# Patient Record
Sex: FEMALE | Race: BLACK OR AFRICAN AMERICAN | NOT HISPANIC OR LATINO | Employment: OTHER | URBAN - METROPOLITAN AREA
[De-identification: names, ages, dates, MRNs, and addresses within clinical notes are randomized per-mention and may not be internally consistent; named-entity substitution may affect disease eponyms.]

---

## 2017-11-27 ENCOUNTER — OFFICE VISIT (OUTPATIENT)
Dept: GASTROENTEROLOGY | Facility: CLINIC | Age: 62
End: 2017-11-27

## 2017-11-27 VITALS
BODY MASS INDEX: 23.88 KG/M2 | WEIGHT: 134.8 LBS | DIASTOLIC BLOOD PRESSURE: 76 MMHG | TEMPERATURE: 97.9 F | HEIGHT: 63 IN | SYSTOLIC BLOOD PRESSURE: 112 MMHG

## 2017-11-27 DIAGNOSIS — K59.04 CHRONIC IDIOPATHIC CONSTIPATION: ICD-10-CM

## 2017-11-27 DIAGNOSIS — R14.0 BLOATING: Primary | ICD-10-CM

## 2017-11-27 PROCEDURE — 99204 OFFICE O/P NEW MOD 45 MIN: CPT | Performed by: INTERNAL MEDICINE

## 2017-11-27 RX ORDER — NICOTINE POLACRILEX 2 MG
GUM BUCCAL
COMMUNITY
End: 2018-09-20

## 2017-11-27 NOTE — PATIENT INSTRUCTIONS
Continue the trulance    Start a daily yogurt    Review the FODMAPS diet    For any additional questions, concerns or changes to your condition after today's office visit please contact the office at [phone].

## 2017-11-27 NOTE — PROGRESS NOTES
Chief Complaint   Patient presents with   • Bloated       Subjective     HPI    Jaycee Slaughter is a 62 y.o. female with a past medical history noted below who presents for evaluation of bloating and constipation.  Symptoms started about 6 months ago.  She developed bloating and constipation.  She felt bloated and had distension to the point that she felt she looked pregnant.  She found this was occurring following BMs and urination.  This will last about 30 minutes and then go away but will recur after her next bathroom visit.  She has had constipation that is described as hard to pass stools and straining.  She took a week long trulance sample From her PCP and found that it was efficacious; yielding soft and easy to pass stools.  She has completed the sample and awaiting this visit to decide on further constipation treatment.    His previously had issues with bloating and gas with eating broccoli.  She has since stopped eating those foods.  She generally avoids dairy milk and uses predominantly coconut milk.    No nausea or vomiting.  Her weight is stable.    Baseline prior to new complaints was a soft and easy to pass BM daily.      Saw dr Ferris but wanted a second opinion as he told her to take Metamucil.    Last colonoscopy 10/2016 with Dr Pagan and normal.      No GI malignancy in her family.  Her father had GI bleeding.    No smoking, occasional ETOH.  Works for [employer].      Past Medical History:   Diagnosis Date   • Cataract     BILATERAL EARLY   • Hypertension    • Mass of arm     RIGHT   • Psoriasis    • Psoriasis          Current Outpatient Prescriptions:   •  Biotin 1 MG capsule, Take  by mouth., Disp: , Rfl:   •  CALCIUM-VITAMIN D PO, Take  by mouth., Disp: , Rfl:   •  losartan-hydrochlorothiazide (HYZAAR) 100-25 MG per tablet, Take 1 tablet by mouth every morning., Disp: , Rfl:   •  potassium chloride (K-DUR,KLOR-CON) 20 MEQ CR tablet, Take 20 mEq by mouth daily., Disp: ,  Rfl:   •  Plecanatide (TRULANCE PO), Take  by mouth., Disp: , Rfl:   •  Plecanatide (TRULANCE) 3 MG tablet, Take 1 tablet by mouth Daily., Disp: 30 tablet, Rfl: 2    No Known Allergies    Social History     Social History   • Marital status: Single     Spouse name: N/A   • Number of children: N/A   • Years of education: N/A     Occupational History   • Not on file.     Social History Main Topics   • Smoking status: Never Smoker   • Smokeless tobacco: Never Used   • Alcohol use No   • Drug use: No   • Sexual activity: Defer     Other Topics Concern   • Not on file     Social History Narrative       Family History   Problem Relation Age of Onset   • Cancer Mother      skin - basil cell carcinoma   • Bleeding Disorder Father      intestinal       Review of Systems   Constitutional: Negative for activity change, appetite change and fatigue.   HENT: Negative for sore throat and trouble swallowing.    Respiratory: Negative.    Cardiovascular: Negative.    Gastrointestinal: Positive for abdominal distention and constipation. Negative for abdominal pain, blood in stool, nausea and vomiting.   Endocrine: Negative for cold intolerance and heat intolerance.   Genitourinary: Negative for difficulty urinating, dysuria and frequency.   Musculoskeletal: Negative for arthralgias, back pain and myalgias.   Skin: Negative.    Hematological: Negative for adenopathy. Does not bruise/bleed easily.   All other systems reviewed and are negative.      Objective     Vitals:    11/27/17 0944   BP: 112/76   Temp: 97.9 °F (36.6 °C)     Last 2 weights    11/27/17  0944   Weight: 134 lb 12.8 oz (61.1 kg)     Body mass index is 23.88 kg/(m^2).    Physical Exam   Constitutional: She is oriented to person, place, and time. She appears well-developed and well-nourished. No distress.   HENT:   Head: Normocephalic and atraumatic.   Right Ear: External ear normal.   Left Ear: External ear normal.   Nose: Nose normal.   Mouth/Throat: Oropharynx is clear  and moist.   Eyes: Conjunctivae and EOM are normal. Right eye exhibits no discharge. Left eye exhibits no discharge. No scleral icterus.   Neck: Normal range of motion. Neck supple. No thyromegaly present.   No supraclavicular adenopathy   Cardiovascular: Normal rate, regular rhythm, normal heart sounds and intact distal pulses.  Exam reveals no gallop.    No murmur heard.  No lower extremity edema   Pulmonary/Chest: Effort normal and breath sounds normal. No respiratory distress. She has no wheezes.   Abdominal: Soft. Normal appearance and bowel sounds are normal. She exhibits no distension and no mass. There is no hepatosplenomegaly. There is no tenderness. There is no rigidity, no rebound and no guarding. No hernia.   Genitourinary:   Genitourinary Comments: Rectal exam deferred   Musculoskeletal: Normal range of motion. She exhibits no edema or tenderness.   No atrophy of upper or lower extremities.  Normal digits and nails of both hands.   Lymphadenopathy:     She has no cervical adenopathy.   Neurological: She is alert and oriented to person, place, and time. She displays no atrophy. Coordination normal.   Skin: Skin is warm and dry. No rash noted. She is not diaphoretic. No erythema.   Psychiatric: She has a normal mood and affect. Her behavior is normal. Judgment and thought content normal.   Vitals reviewed.      WBC   Date Value Ref Range Status   03/14/2016 7.80 4.50 - 10.70 10*3/mm3 Final     RBC   Date Value Ref Range Status   03/14/2016 4.03 3.90 - 5.20 10*6/mm3 Final     Hemoglobin   Date Value Ref Range Status   03/14/2016 12.6 11.9 - 15.5 g/dL Final     Hematocrit   Date Value Ref Range Status   03/14/2016 37.4 35.6 - 45.5 % Final     MCV   Date Value Ref Range Status   03/14/2016 92.8 80.5 - 98.2 fL Final     MCH   Date Value Ref Range Status   03/14/2016 31.3 26.9 - 32.7 pg Final     MCHC   Date Value Ref Range Status   03/14/2016 33.7 32.4 - 36.3 g/dL Final     RDW   Date Value Ref Range Status    03/14/2016 13.3 (H) 11.7 - 13.0 % Final     RDW-SD   Date Value Ref Range Status   03/14/2016 44.9 37.0 - 54.0 fl Final     MPV   Date Value Ref Range Status   03/14/2016 10.7 6.0 - 12.0 fL Final     Platelets   Date Value Ref Range Status   03/14/2016 273 140 - 500 10*3/mm3 Final       Glucose   Date Value Ref Range Status   03/14/2016 83 65 - 99 mg/dL Final     Sodium   Date Value Ref Range Status   03/14/2016 141 136 - 145 mmol/L Final     Potassium   Date Value Ref Range Status   03/14/2016 3.1 (L) 3.5 - 5.2 mmol/L Final     CO2   Date Value Ref Range Status   03/14/2016 28.1 22.0 - 29.0 mmol/L Final     Chloride   Date Value Ref Range Status   03/14/2016 101 99 - 107 mmol/L Final     Anion Gap   Date Value Ref Range Status   03/14/2016 11.9 mmol/L Final     Creatinine   Date Value Ref Range Status   03/14/2016 0.73 0.57 - 1.00 mg/dL Final     BUN   Date Value Ref Range Status   03/14/2016 15 8 - 23 mg/dL Final     BUN/Creatinine Ratio   Date Value Ref Range Status   03/14/2016 20.5 7.0 - 25.0 Final     Calcium   Date Value Ref Range Status   03/14/2016 9.4 8.6 - 10.5 mg/dL Final         Imaging Results (last 7 days)     ** No results found for the last 168 hours. **            No notes on file    Assessment/Plan    1. Bloating: New symptom for her, interestingly following bowel movements and urination.  Short duration.?  Aerophagia or dysbiosis    2. Constipation: ? Association with above, she has done well with trulance samples from her pcp    Plan  -continue trulance for constipation  -to start daily yogurt  -review fodmaps diet  -Will get records of her last colonoscopy  -Back in a few months to see how she is doing    Jaycee was seen today for bloated.    Diagnoses and all orders for this visit:    Bloating    Chronic idiopathic constipation  -     Plecanatide (TRULANCE) 3 MG tablet; Take 1 tablet by mouth Daily.        I have discussed the above plan with the patient.  They verbalize understanding and are  in agreement with the plan.  They have been advised to contact the office for any questions, concerns, or changes related to their health.    Dictated utilizing Dragon dictation

## 2017-12-01 ENCOUNTER — DOCUMENTATION (OUTPATIENT)
Dept: GASTROENTEROLOGY | Facility: CLINIC | Age: 62
End: 2017-12-01

## 2017-12-13 ENCOUNTER — TELEPHONE (OUTPATIENT)
Dept: GASTROENTEROLOGY | Facility: CLINIC | Age: 62
End: 2017-12-13

## 2017-12-13 NOTE — TELEPHONE ENCOUNTER
We can try linzess 145mcg.  Let's try a week of samples first if she can come in and pick them up. Thx

## 2017-12-13 NOTE — TELEPHONE ENCOUNTER
----- Message from Elva Steven sent at 12/13/2017 10:29 AM EST -----  Regarding: PT CALLED - TRULANCE GAVE PT SEVERE DIARRHEA  Contact: [phone]  ALSO MED IS TO EXPENSIVE. PHARM DID CALL. WHAT IS AN ALTERNATIVE? OR CELL [phone]. PT SAID IT IS OKAY TO LEAVE INFO ON [facility].

## 2017-12-13 NOTE — TELEPHONE ENCOUNTER
Call to pt.  Advise per Dr Becerra that can try Linzess 145 mcg.  Try a week of samples first.  Pt verb understanding.  2 boxes of Linzess 145 mcg at .

## 2018-09-20 ENCOUNTER — OFFICE VISIT (OUTPATIENT)
Dept: GASTROENTEROLOGY | Facility: CLINIC | Age: 63
End: 2018-09-20

## 2018-09-20 VITALS
DIASTOLIC BLOOD PRESSURE: 72 MMHG | SYSTOLIC BLOOD PRESSURE: 114 MMHG | BODY MASS INDEX: 24.63 KG/M2 | TEMPERATURE: 98.5 F | HEIGHT: 63 IN | WEIGHT: 139 LBS

## 2018-09-20 DIAGNOSIS — R14.0 BLOATING: ICD-10-CM

## 2018-09-20 DIAGNOSIS — K59.09 OTHER CONSTIPATION: Primary | ICD-10-CM

## 2018-09-20 PROCEDURE — 99213 OFFICE O/P EST LOW 20 MIN: CPT | Performed by: INTERNAL MEDICINE

## 2018-09-20 NOTE — PATIENT INSTRUCTIONS
Try gas x-- 2 tabs every morning for the next 2 weeks    If no improvement, then will consider rifaximin antibiotic    Try IB Justus-- 1-2 capsules for bloating, discomfort    For any additional questions, concerns or changes to your condition after today's office visit please contact the office at [phone].

## 2018-09-20 NOTE — PROGRESS NOTES
Chief Complaint   Patient presents with   • Follow-up     Bloating     Subjective     HPI  Jaycee Slaughter is a 63 y.o. female who presents for follow-up of constipation and bloating.  Was seen last in November 2017.  We had tried Trulance but this gave her severe diarrhea.  We then tried 145 µg of Linzess.  This did help her but since retiring in December, she hasn't needed much medication for constipation.    He has been quite regular without needing any kind of supplementation.  However she persists with symptoms of bloating.    Bloating following BMs.  Feels swollen in her upper abdomen region.  Lasts about 30 to 40 minutes and then goes away.  Reviewed fodmaps, cut out broccoli, fiber bars but still persists.  Does occasionally take gas ex for severe bloating symptoms but has not used it on a regular basis.    Her weight is stable.  She is eating drinking well.  She otherwise feels fine.    Currently in a psoriasis study    She is up-to-date on her screening colonoscopy      Past Medical History:   Diagnosis Date   • Cataract     BILATERAL EARLY   • Hypertension    • Mass of arm     RIGHT   • Psoriasis    • Psoriasis        Social History     Social History   • Marital status: Single     Social History Main Topics   • Smoking status: Never Smoker   • Smokeless tobacco: Never Used   • Alcohol use No   • Drug use: No   • Sexual activity: Defer     Other Topics Concern   • Not on file         Current Outpatient Prescriptions:   •  losartan-hydrochlorothiazide (HYZAAR) 100-25 MG per tablet, Take 1 tablet by mouth every morning., Disp: , Rfl:   •  potassium chloride (K-DUR,KLOR-CON) 20 MEQ CR tablet, Take 20 mEq by mouth daily., Disp: , Rfl:   •  Secukinumab (COSENTYX SC), Inject  under the skin into the appropriate area as directed Every 30 (Thirty) Days., Disp: , Rfl:     Review of Systems   Constitutional: Negative for activity change, appetite change, chills and fever.   HENT: Negative for trouble  swallowing.    Respiratory: Negative.    Cardiovascular: Negative.  Negative for chest pain.   Gastrointestinal: Positive for abdominal distention. Negative for abdominal pain, anal bleeding, constipation, diarrhea, nausea and vomiting.   Genitourinary: Negative for dysuria, frequency and hematuria.       Objective   Vitals:    09/20/18 1055   BP: 114/72   Temp: 98.5 °F (36.9 °C)     1    09/20/18  1055   Weight: 63 kg (139 lb)     Body mass index is 24.62 kg/m².      Physical Exam   Constitutional: She is oriented to person, place, and time. She appears well-developed and well-nourished. No distress.   HENT:   Head: Normocephalic and atraumatic.   Right Ear: External ear normal.   Left Ear: External ear normal.   Nose: Nose normal.   Mouth/Throat: Oropharynx is clear and moist.   Eyes: Conjunctivae and EOM are normal. Right eye exhibits no discharge. Left eye exhibits no discharge. No scleral icterus.   Neck: Normal range of motion. Neck supple. No thyromegaly present.   No supraclavicular adenopathy   Cardiovascular: Normal rate, regular rhythm, normal heart sounds and intact distal pulses.  Exam reveals no gallop.    No murmur heard.  No lower extremity edema   Pulmonary/Chest: Effort normal and breath sounds normal. No respiratory distress. She has no wheezes.   Abdominal: Soft. Normal appearance and bowel sounds are normal. She exhibits no distension and no mass. There is no hepatosplenomegaly. There is no tenderness. There is no rigidity, no rebound and no guarding. No hernia.   Genitourinary:   Genitourinary Comments: Rectal exam deferred   Musculoskeletal: Normal range of motion. She exhibits no edema or tenderness.   No atrophy of upper or lower extremities.  Normal digits and nails of both hands.   Lymphadenopathy:     She has no cervical adenopathy.   Neurological: She is alert and oriented to person, place, and time. She displays no atrophy. Coordination normal.   Skin: Skin is warm and dry. No rash  noted. She is not diaphoretic. No erythema.   Psychiatric: She has a normal mood and affect. Her behavior is normal. Judgment and thought content normal.   Vitals reviewed.      WBC   Date Value Ref Range Status   03/14/2016 7.80 4.50 - 10.70 10*3/mm3 Final     RBC   Date Value Ref Range Status   03/14/2016 4.03 3.90 - 5.20 10*6/mm3 Final     Hemoglobin   Date Value Ref Range Status   03/14/2016 12.6 11.9 - 15.5 g/dL Final     Hematocrit   Date Value Ref Range Status   03/14/2016 37.4 35.6 - 45.5 % Final     MCV   Date Value Ref Range Status   03/14/2016 92.8 80.5 - 98.2 fL Final     MCH   Date Value Ref Range Status   03/14/2016 31.3 26.9 - 32.7 pg Final     MCHC   Date Value Ref Range Status   03/14/2016 33.7 32.4 - 36.3 g/dL Final     RDW   Date Value Ref Range Status   03/14/2016 13.3 (H) 11.7 - 13.0 % Final     RDW-SD   Date Value Ref Range Status   03/14/2016 44.9 37.0 - 54.0 fl Final     MPV   Date Value Ref Range Status   03/14/2016 10.7 6.0 - 12.0 fL Final     Platelets   Date Value Ref Range Status   03/14/2016 273 140 - 500 10*3/mm3 Final       Lab Results   Component Value Date    GLUCOSE 83 03/14/2016    BUN 15 03/14/2016    CREATININE 0.73 03/14/2016    EGFRIFAFRI 99 03/14/2016    BCR 20.5 03/14/2016    CO2 28.1 03/14/2016    CALCIUM 9.4 03/14/2016         Imaging Results (last 7 days)     ** No results found for the last 168 hours. **            Assessment/Plan    Bloating: persist despite improved constipation.  ? dysbiosis vs functional    Constipation: resolved    Plan  Gas x-- 2 tabs every morning for the next 2 weeks    If no improvement, then will consider rifaximin antibiotic    IB Justus-- 1-2 capsules for bloating, discomfort    Jaycee was seen today for follow-up.    Diagnoses and all orders for this visit:    Other constipation    Bloating        Dictated utilizing Dragon dictation

## 2018-10-16 ENCOUNTER — TELEPHONE (OUTPATIENT)
Dept: GASTROENTEROLOGY | Facility: CLINIC | Age: 63
End: 2018-10-16

## 2018-12-11 ENCOUNTER — TELEPHONE (OUTPATIENT)
Dept: GASTROENTEROLOGY | Facility: CLINIC | Age: 63
End: 2018-12-11

## 2018-12-11 NOTE — TELEPHONE ENCOUNTER
"Call to pt.  States tried IBGard as instructed with call of 10/16.  Continues to experience bloating when has daily BM.  States while sitting on toilet, stomach swells and \"look like 4 mo's pregnant\".  Lasts about 45 min.    States understood that if IBGard and GasX did not work, that abx that targets gut may be next step.  Interested in pursuing this unless too expensive.  States cannot afford pill that costs $100.  Advise that sometimes PA may mitigate cost.    Update/request to Dr Becerra.  "

## 2018-12-11 NOTE — TELEPHONE ENCOUNTER
----- Message from Elva Steven sent at 12/11/2018  8:49 AM EST -----  Regarding:  UPDATE DATE ON BLOATING & NEXT STEP  Contact: [phone]  PT HAS TO LEAVE HOME AROUND 2PM WILL RETURN ABOUT 3:30.

## 2018-12-12 NOTE — TELEPHONE ENCOUNTER
Call to pt.  Advise per Dr Becerra that rifaximin has been ordered.    Advise that PA will probably be required.  Verb understanding.

## 2018-12-12 NOTE — TELEPHONE ENCOUNTER
Call from pt.  States spoke with pharmacy - PA on xifaxan not required.  However, cost if $796 - pt cannot afford.  Asking for alternative.     Message to DR Becerra.

## 2018-12-13 DIAGNOSIS — R14.0 ABDOMINAL BLOATING: Primary | ICD-10-CM

## 2018-12-13 RX ORDER — SODIUM CHLORIDE, SODIUM LACTATE, POTASSIUM CHLORIDE, CALCIUM CHLORIDE 600; 310; 30; 20 MG/100ML; MG/100ML; MG/100ML; MG/100ML
30 INJECTION, SOLUTION INTRAVENOUS CONTINUOUS
Status: CANCELLED | OUTPATIENT
Start: 2019-01-15

## 2018-12-13 NOTE — TELEPHONE ENCOUNTER
There really isn't an alternative to the Xifaxan.    I do recommend having an EGD and we can do small bowel aspirate to culture and see if she actually has small intestinal bacterial overgrowth.  If she does, in fact, have SIBO, then a systemic antibiotic would be warranted.    I have entered the EGD case request.

## 2018-12-14 NOTE — TELEPHONE ENCOUNTER
Call to pt.  Advise per Dr Becerra that there really isn't an alternative to xifaxan.    Recommend having an egd and can do small bowel aspirate to culture and see if actually has sibo.  IF does, in fact, have sibo, then a systemic abx would be warranted.    Advise pt that Scheduling will contact to arrange.  Verb understanding.

## 2019-01-04 PROBLEM — R14.0 ABDOMINAL BLOATING: Status: ACTIVE | Noted: 2019-01-04

## 2019-01-15 ENCOUNTER — ANESTHESIA (OUTPATIENT)
Dept: GASTROENTEROLOGY | Facility: HOSPITAL | Age: 64
End: 2019-01-15

## 2019-01-15 ENCOUNTER — HOSPITAL ENCOUNTER (OUTPATIENT)
Facility: HOSPITAL | Age: 64
Setting detail: HOSPITAL OUTPATIENT SURGERY
Discharge: HOME | End: 2019-01-15
Attending: INTERNAL MEDICINE | Admitting: INTERNAL MEDICINE

## 2019-01-15 ENCOUNTER — ANESTHESIA EVENT (OUTPATIENT)
Dept: GASTROENTEROLOGY | Facility: HOSPITAL | Age: 64
End: 2019-01-15

## 2019-01-15 VITALS
SYSTOLIC BLOOD PRESSURE: 131 MMHG | WEIGHT: 133 LBS | OXYGEN SATURATION: 100 % | RESPIRATION RATE: 12 BRPM | HEIGHT: 63 IN | BODY MASS INDEX: 23.57 KG/M2 | HEART RATE: 60 BPM | DIASTOLIC BLOOD PRESSURE: 79 MMHG | TEMPERATURE: 97.8 F

## 2019-01-15 DIAGNOSIS — R14.0 ABDOMINAL BLOATING: ICD-10-CM

## 2019-01-15 PROCEDURE — 88305 TISSUE EXAM BY PATHOLOGIST: CPT | Performed by: INTERNAL MEDICINE

## 2019-01-15 PROCEDURE — 25010000002 PROPOFOL 10 MG/ML EMULSION: Performed by: ANESTHESIOLOGY

## 2019-01-15 PROCEDURE — 87071 CULTURE AEROBIC QUANT OTHER: CPT | Performed by: INTERNAL MEDICINE

## 2019-01-15 PROCEDURE — S0260 H&P FOR SURGERY: HCPCS | Performed by: INTERNAL MEDICINE

## 2019-01-15 PROCEDURE — 43239 EGD BIOPSY SINGLE/MULTIPLE: CPT | Performed by: INTERNAL MEDICINE

## 2019-01-15 RX ORDER — SODIUM CHLORIDE, SODIUM LACTATE, POTASSIUM CHLORIDE, CALCIUM CHLORIDE 600; 310; 30; 20 MG/100ML; MG/100ML; MG/100ML; MG/100ML
30 INJECTION, SOLUTION INTRAVENOUS CONTINUOUS
Status: DISCONTINUED | OUTPATIENT
Start: 2019-01-15 | End: 2019-01-15 | Stop reason: HOSPADM

## 2019-01-15 RX ORDER — PROMETHAZINE HYDROCHLORIDE 25 MG/ML
12.5 INJECTION, SOLUTION INTRAMUSCULAR; INTRAVENOUS ONCE AS NEEDED
Status: DISCONTINUED | OUTPATIENT
Start: 2019-01-15 | End: 2019-01-15 | Stop reason: HOSPADM

## 2019-01-15 RX ORDER — PROMETHAZINE HYDROCHLORIDE 25 MG/1
25 TABLET ORAL ONCE AS NEEDED
Status: DISCONTINUED | OUTPATIENT
Start: 2019-01-15 | End: 2019-01-15 | Stop reason: HOSPADM

## 2019-01-15 RX ORDER — PROMETHAZINE HYDROCHLORIDE 25 MG/1
25 SUPPOSITORY RECTAL ONCE AS NEEDED
Status: DISCONTINUED | OUTPATIENT
Start: 2019-01-15 | End: 2019-01-15 | Stop reason: HOSPADM

## 2019-01-15 RX ORDER — PROPOFOL 10 MG/ML
VIAL (ML) INTRAVENOUS AS NEEDED
Status: DISCONTINUED | OUTPATIENT
Start: 2019-01-15 | End: 2019-01-15 | Stop reason: SURG

## 2019-01-15 RX ADMIN — PROPOFOL 50 MG: 10 INJECTION, EMULSION INTRAVENOUS at 10:21

## 2019-01-15 RX ADMIN — PROPOFOL 100 MG: 10 INJECTION, EMULSION INTRAVENOUS at 10:13

## 2019-01-15 RX ADMIN — SODIUM CHLORIDE, POTASSIUM CHLORIDE, SODIUM LACTATE AND CALCIUM CHLORIDE: 600; 310; 30; 20 INJECTION, SOLUTION INTRAVENOUS at 10:12

## 2019-01-15 RX ADMIN — SODIUM CHLORIDE, POTASSIUM CHLORIDE, SODIUM LACTATE AND CALCIUM CHLORIDE 30 ML/HR: 600; 310; 30; 20 INJECTION, SOLUTION INTRAVENOUS at 09:28

## 2019-01-15 RX ADMIN — PROPOFOL 50 MG: 10 INJECTION, EMULSION INTRAVENOUS at 10:19

## 2019-01-15 NOTE — ANESTHESIA PREPROCEDURE EVALUATION
Anesthesia Evaluation     NPO Solid Status: > 8 hours             Airway   Mallampati: II  TM distance: >3 FB  Neck ROM: full  Dental - normal exam     Pulmonary - normal exam   Cardiovascular - normal exam    (+) hypertension,       Neuro/Psych  GI/Hepatic/Renal/Endo      Musculoskeletal     Abdominal    Substance History      OB/GYN          Other                        Anesthesia Plan    ASA 2     Anesthetic plan, all risks, benefits, and alternatives have been provided, discussed and informed consent has been obtained with: patient.

## 2019-01-15 NOTE — H&P
[facility]  Pre Procedure History & Physical    Chief Complaint: abdominal bloating, distension      HPI:   63 y.o. female who presents for follow-up of constipation and bloating.  Was seen last in November 2017.  We had tried Trulance but this gave her severe diarrhea.  We then tried 145 µg of Linzess.  This did help her but since retiring in December, she hasn't needed much medication for constipation.     He has been quite regular without needing any kind of supplementation.  However she persists with symptoms of bloating.     Bloating following BMs.  Feels swollen in her upper abdomen region.  Lasts about 30 to 40 minutes and then goes away.  Reviewed fodmaps, cut out broccoli, fiber bars but still persists.  Does occasionally take gas ex for severe bloating symptoms but has not used it on a regular basis.     Her weight is stable.  She is eating drinking well.  She otherwise feels fine.     Currently in a psoriasis study     She is up-to-date on her screening colonoscopy    Past Medical History:   Past Medical History:   Diagnosis Date   • Cataract     BILATERAL EARLY   • Hypertension    • Mass of arm     RIGHT   • Psoriasis    • Psoriasis        Family History:  Family History   Problem Relation Age of Onset   • Cancer Mother         skin - basil cell carcinoma   • Bleeding Disorder Father         intestinal       Social History:   reports that  has never smoked. she has never used smokeless tobacco. She reports that she does not drink alcohol or use drugs.    Medications:   Medications Prior to Admission   Medication Sig Dispense Refill Last Dose   • losartan-hydrochlorothiazide (HYZAAR) 100-25 MG per tablet Take 1 tablet by mouth every morning.   1/15/2019 at 0700   • Secukinumab (COSENTYX SC) Inject  under the skin into the appropriate area as directed Every 30 (Thirty) Days.   1/9/2019       Allergies:  Patient has no known allergies.    ROS:    Pertinent items are noted in HPI  "    Objective     Blood pressure 142/97, pulse 77, temperature 97.8 °F (36.6 °C), temperature source Oral, resp. rate 12, height 160 cm (63\"), weight 60.3 kg (133 lb), SpO2 100 %.    Physical Exam   Constitutional: Pt is oriented to person, place, and time and well-developed, well-nourished, and in no distress.   HENT:   Mouth/Throat: Oropharynx is clear and moist.   Neck: Normal range of motion. Neck supple.   Cardiovascular: Normal rate, regular rhythm and normal heart sounds.    Pulmonary/Chest: Effort normal and breath sounds normal. No respiratory distress. No  wheezes.   Abdominal: Soft. Bowel sounds are normal.   Skin: Skin is warm and dry.   Psychiatric: Mood, memory, affect and judgment normal.     Assessment/Plan     Diagnosis: abdominal bloating, distension        Anticipated Surgical Procedure:  EGD      The risks, benefits, and alternatives of this procedure have been discussed with the patient or the responsible party- the patient understands and agrees to proceed.  "

## 2019-01-15 NOTE — ANESTHESIA POSTPROCEDURE EVALUATION
Patient: Jaycee Pathak    Procedure Summary     Date:  01/15/19 Room / Location:   [facility]    Anesthesia Start:  1012 Anesthesia Stop:  1033    Procedure:  ESOPHAGOGASTRODUODENOSCOPY with duodenal aspirate and biopsies (N/A Esophagus) Diagnosis:       Abdominal bloating      (Abdominal bloating [R14.0])    Surgeon:  Renetta Becerra MD Provider:  Yovany Angulo MD    Anesthesia Type:  MAC ASA Status:  2          Anesthesia Type: MAC  Last vitals  BP   104/68 (01/15/19 1032)   Temp   36.6 °C (97.8 °F) (01/15/19 1032)   Pulse   78 (01/15/19 1032)   Resp   12 (01/15/19 1032)     SpO2   99 % (01/15/19 1032)     Post Anesthesia Care and Evaluation    Patient location during evaluation: bedside  Pain management: adequate  Airway patency: patent  Anesthetic complications: No anesthetic complications    Cardiovascular status: acceptable  Respiratory status: acceptable  Hydration status: acceptable

## 2019-01-16 LAB
CYTO UR: NORMAL
LAB AP CASE REPORT: NORMAL
LAB AP DIAGNOSIS COMMENT: NORMAL
PATH REPORT.FINAL DX SPEC: NORMAL
PATH REPORT.GROSS SPEC: NORMAL

## 2019-01-17 LAB — BACTERIA SPEC AEROBE CULT: NORMAL

## 2019-01-22 NOTE — PROGRESS NOTES
The biopsies of her stomach and small bowel only showed some very very mild inflammation.    Her small bowel culture did not grow out anything.    She'll like to see a dietitian to review the FODMAPS diet to help with gas and bloating?

## 2019-01-23 ENCOUNTER — TELEPHONE (OUTPATIENT)
Dept: GASTROENTEROLOGY | Facility: CLINIC | Age: 64
End: 2019-01-23

## 2019-01-23 NOTE — TELEPHONE ENCOUNTER
----- Message from Renetta Becerra MD sent at 1/22/2019  5:27 PM EST -----  The biopsies of her stomach and small bowel only showed some very very mild inflammation.    Her small bowel culture did not grow out anything.    She'll like to see a dietitian to review the FODMAPS diet to help with gas and bloating?

## 2019-01-23 NOTE — TELEPHONE ENCOUNTER
Called pt and advised per Dr Becerra that the bx of her stomach and sm bowel showed only some very very mild inflammation.    Her sm bowel culture did not grown out anything.     Pt verb understanding and would like a list of dieticians she can call for their fees .  Pt does not think her insurance will cover this. ADvised pt will send her a list.     Pt verb understanding and stated to be sure and thank Dr Becerra for her.

## 2019-11-25 ENCOUNTER — TRANSCRIBE ORDERS (OUTPATIENT)
Dept: ADMINISTRATIVE | Facility: HOSPITAL | Age: 64
End: 2019-11-25

## 2019-11-25 DIAGNOSIS — Z12.31 VISIT FOR SCREENING MAMMOGRAM: Primary | ICD-10-CM

## 2021-03-16 ENCOUNTER — BULK ORDERING (OUTPATIENT)
Dept: CASE MANAGEMENT | Facility: OTHER | Age: 66
End: 2021-03-16

## 2021-03-16 DIAGNOSIS — Z23 IMMUNIZATION DUE: ICD-10-CM

## 2022-01-08 ENCOUNTER — HOSPITAL ENCOUNTER (EMERGENCY)
Facility: HOSPITAL | Age: 67
Discharge: HOME | End: 2022-01-08
Attending: EMERGENCY MEDICINE | Admitting: EMERGENCY MEDICINE

## 2022-01-08 VITALS
RESPIRATION RATE: 16 BRPM | TEMPERATURE: 97.6 F | OXYGEN SATURATION: 98 % | DIASTOLIC BLOOD PRESSURE: 62 MMHG | SYSTOLIC BLOOD PRESSURE: 102 MMHG | HEART RATE: 78 BPM

## 2022-01-08 DIAGNOSIS — R55 NEAR SYNCOPE: Primary | ICD-10-CM

## 2022-01-08 LAB
ALBUMIN SERPL-MCNC: 3.6 G/DL (ref 3.5–5.2)
ALBUMIN/GLOB SERPL: 1.1 G/DL
ALP SERPL-CCNC: 91 U/L (ref 39–117)
ALT SERPL W P-5'-P-CCNC: 11 U/L (ref 1–33)
ANION GAP SERPL CALCULATED.3IONS-SCNC: 9.5 MMOL/L (ref 5–15)
AST SERPL-CCNC: 18 U/L (ref 1–32)
BASOPHILS # BLD AUTO: 0.06 10*3/MM3 (ref 0–0.2)
BASOPHILS NFR BLD AUTO: 0.5 % (ref 0–1.5)
BILIRUB SERPL-MCNC: 0.3 MG/DL (ref 0–1.2)
BUN SERPL-MCNC: 16 MG/DL (ref 8–23)
BUN/CREAT SERPL: 17.4 (ref 7–25)
CALCIUM SPEC-SCNC: 8.9 MG/DL (ref 8.6–10.5)
CHLORIDE SERPL-SCNC: 106 MMOL/L (ref 98–107)
CO2 SERPL-SCNC: 26.5 MMOL/L (ref 22–29)
CREAT SERPL-MCNC: 0.92 MG/DL (ref 0.57–1)
DEPRECATED RDW RBC AUTO: 43.8 FL (ref 37–54)
EOSINOPHIL # BLD AUTO: 0.11 10*3/MM3 (ref 0–0.4)
EOSINOPHIL NFR BLD AUTO: 1 % (ref 0.3–6.2)
ERYTHROCYTE [DISTWIDTH] IN BLOOD BY AUTOMATED COUNT: 13.1 % (ref 12.3–15.4)
GFR SERPL CREATININE-BSD FRML MDRD: 74 ML/MIN/1.73
GLOBULIN UR ELPH-MCNC: 3.3 GM/DL
GLUCOSE SERPL-MCNC: 131 MG/DL (ref 65–99)
HCT VFR BLD AUTO: 34.9 % (ref 34–46.6)
HGB BLD-MCNC: 12.4 G/DL (ref 12–15.9)
IMM GRANULOCYTES # BLD AUTO: 0.08 10*3/MM3 (ref 0–0.05)
IMM GRANULOCYTES NFR BLD AUTO: 0.7 % (ref 0–0.5)
LYMPHOCYTES # BLD AUTO: 2 10*3/MM3 (ref 0.7–3.1)
LYMPHOCYTES NFR BLD AUTO: 17.6 % (ref 19.6–45.3)
MCH RBC QN AUTO: 32.9 PG (ref 26.6–33)
MCHC RBC AUTO-ENTMCNC: 35.5 G/DL (ref 31.5–35.7)
MCV RBC AUTO: 92.6 FL (ref 79–97)
MONOCYTES # BLD AUTO: 0.73 10*3/MM3 (ref 0.1–0.9)
MONOCYTES NFR BLD AUTO: 6.4 % (ref 5–12)
NEUTROPHILS NFR BLD AUTO: 73.8 % (ref 42.7–76)
NEUTROPHILS NFR BLD AUTO: 8.36 10*3/MM3 (ref 1.7–7)
NRBC BLD AUTO-RTO: 0 /100 WBC (ref 0–0.2)
PLATELET # BLD AUTO: 265 10*3/MM3 (ref 140–450)
PMV BLD AUTO: 11.1 FL (ref 6–12)
POTASSIUM SERPL-SCNC: 3.6 MMOL/L (ref 3.5–5.2)
PROT SERPL-MCNC: 6.9 G/DL (ref 6–8.5)
RBC # BLD AUTO: 3.77 10*6/MM3 (ref 3.77–5.28)
SODIUM SERPL-SCNC: 142 MMOL/L (ref 136–145)
WBC NRBC COR # BLD: 11.34 10*3/MM3 (ref 3.4–10.8)

## 2022-01-08 PROCEDURE — 80053 COMPREHEN METABOLIC PANEL: CPT | Performed by: EMERGENCY MEDICINE

## 2022-01-08 PROCEDURE — 93005 ELECTROCARDIOGRAM TRACING: CPT

## 2022-01-08 PROCEDURE — 99283 EMERGENCY DEPT VISIT LOW MDM: CPT

## 2022-01-08 PROCEDURE — 36415 COLL VENOUS BLD VENIPUNCTURE: CPT

## 2022-01-08 PROCEDURE — 93010 ELECTROCARDIOGRAM REPORT: CPT | Performed by: INTERNAL MEDICINE

## 2022-01-08 PROCEDURE — 85025 COMPLETE CBC W/AUTO DIFF WBC: CPT | Performed by: EMERGENCY MEDICINE

## 2022-01-08 NOTE — ED NOTES
Patient from [facility] via EMS; patient had given blood at 2:30 today and had a syncopal episode while shopping. Patient has not eaten today and has also not given blood in multiple years.      Tiffany Morales RN  01/08/22 8503

## 2022-01-08 NOTE — ED PROVIDER NOTES
EMERGENCY DEPARTMENT ENCOUNTER    Room Number:  40/40  Date of encounter:  1/8/2022  PCP: Christopher Azevedo MD  Historian: Patient      HPI:  Chief Complaint: Near syncope  A complete HPI/ROS/PMH/PSH/SH/FH are unobtainable due to: Nothing    Context: Jaycee Pathak is a 66 y.o. female who presents to the ED c/o syncopal episode that occurred earlier today just after she donated some blood.  Patient said she left the donation center feeling okay and drinking some juice.  She went [place]door to [employer] and after walking around for a few minutes she began to feel lightheaded.  She asked for assistance to get over to a chair, and then she began feeling very anxious, dizzy and lightheaded, and had numbness and tingling in all her extremities.  It appears as though she was hyperventilating.    She did not completely pass out, and all the symptoms resolved after about 20 to 30 minutes.  She was brought by EMS, and says she feels much better now.  She really does not state any chest pain to me, but she did say she was quite short of breath.    Patient has a history of passing out one other time, but no history of coronary artery disease or any other significant medical abnormalities.  She said this is the first time she donated blood about 12 years, as far she knows it was an uneventful donation of a regular amount of blood      PAST MEDICAL HISTORY  Active Ambulatory Problems     Diagnosis Date Noted   • Other constipation 09/20/2018   • Bloating 09/20/2018   • Abdominal bloating 01/04/2019     Resolved Ambulatory Problems     Diagnosis Date Noted   • No Resolved Ambulatory Problems     Past Medical History:   Diagnosis Date   • Cataract    • Hypertension    • Mass of arm    • Psoriasis    • Psoriasis          PAST SURGICAL HISTORY  Past Surgical History:   Procedure Laterality Date   • COLONOSCOPY  2016   • ENDOSCOPY N/A 1/15/2019    Procedure: ESOPHAGOGASTRODUODENOSCOPY with duodenal aspirate and biopsies;   Surgeon: Renetta Becerra MD;  Location: [facility] ENDOSCOPY;  Service: Gastroenterology   • EXCISION MASS ARM Right 3/18/2016    Procedure: RT FOREARM EXCISION MASS UPPER EXTREMITY;  Surgeon: Richard Alba MD;  Location: [facility] OR [facility] [place];  Service:    • GANGLION CYST EXCISION Left    • HYSTERECTOMY  2007    total         FAMILY HISTORY  Family History   Problem Relation Age of Onset   • Cancer Mother         skin - basil cell carcinoma   • Bleeding Disorder Father         intestinal         SOCIAL HISTORY  Social History     Socioeconomic History   • Marital status: Single   Tobacco Use   • Smoking status: Never Smoker   • Smokeless tobacco: Never Used   Substance and Sexual Activity   • Alcohol use: No   • Drug use: No   • Sexual activity: Defer         ALLERGIES  Patient has no known allergies.        REVIEW OF SYSTEMS  Review of Systems     All systems reviewed and negative except for those discussed in HPI.       PHYSICAL EXAM    I have reviewed the triage vital signs and nursing notes.    ED Triage Vitals   Temp Heart Rate Resp BP SpO2   01/08/22 1726 01/08/22 1726 01/08/22 1726 01/08/22 1726 01/08/22 1731   97.6 °F (36.4 °C) 78 16 102/62 98 %      Temp src Heart Rate Source Patient Position BP Location FiO2 (%)   01/08/22 1726 -- -- -- --   Oral           Physical Exam  GENERAL: not distressed  HENT: nares patent  EYES: no scleral icterus  CV: regular rhythm, regular rate  RESPIRATORY: normal effort  ABDOMEN: soft  MUSCULOSKELETAL: no deformity  NEURO: alert, moves all extremities, follows commands  SKIN: warm, dry        LAB RESULTS  Recent Results (from the past 24 hour(s))   Comprehensive Metabolic Panel    Collection Time: 01/08/22  5:55 PM    Specimen: Blood   Result Value Ref Range    Glucose 131 (H) 65 - 99 mg/dL    BUN 16 8 - 23 mg/dL    Creatinine 0.92 0.57 - 1.00 mg/dL    Sodium 142 136 - 145 mmol/L    Potassium 3.6 3.5 - 5.2 mmol/L    Chloride 106 98 - 107 mmol/L    CO2 26.5 22.0 - 29.0 mmol/L     Calcium 8.9 8.6 - 10.5 mg/dL    Total Protein 6.9 6.0 - 8.5 g/dL    Albumin 3.60 3.50 - 5.20 g/dL    ALT (SGPT) 11 1 - 33 U/L    AST (SGOT) 18 1 - 32 U/L    Alkaline Phosphatase 91 39 - 117 U/L    Total Bilirubin 0.3 0.0 - 1.2 mg/dL    eGFR  African Amer 74 >60 mL/min/1.73    Globulin 3.3 gm/dL    A/G Ratio 1.1 g/dL    BUN/Creatinine Ratio 17.4 7.0 - 25.0    Anion Gap 9.5 5.0 - 15.0 mmol/L   CBC Auto Differential    Collection Time: 01/08/22  5:55 PM    Specimen: Blood   Result Value Ref Range    WBC 11.34 (H) 3.40 - 10.80 10*3/mm3    RBC 3.77 3.77 - 5.28 10*6/mm3    Hemoglobin 12.4 12.0 - 15.9 g/dL    Hematocrit 34.9 34.0 - 46.6 %    MCV 92.6 79.0 - 97.0 fL    MCH 32.9 26.6 - 33.0 pg    MCHC 35.5 31.5 - 35.7 g/dL    RDW 13.1 12.3 - 15.4 %    RDW-SD 43.8 37.0 - 54.0 fl    MPV 11.1 6.0 - 12.0 fL    Platelets 265 140 - 450 10*3/mm3    Neutrophil % 73.8 42.7 - 76.0 %    Lymphocyte % 17.6 (L) 19.6 - 45.3 %    Monocyte % 6.4 5.0 - 12.0 %    Eosinophil % 1.0 0.3 - 6.2 %    Basophil % 0.5 0.0 - 1.5 %    Immature Grans % 0.7 (H) 0.0 - 0.5 %    Neutrophils, Absolute 8.36 (H) 1.70 - 7.00 10*3/mm3    Lymphocytes, Absolute 2.00 0.70 - 3.10 10*3/mm3    Monocytes, Absolute 0.73 0.10 - 0.90 10*3/mm3    Eosinophils, Absolute 0.11 0.00 - 0.40 10*3/mm3    Basophils, Absolute 0.06 0.00 - 0.20 10*3/mm3    Immature Grans, Absolute 0.08 (H) 0.00 - 0.05 10*3/mm3    nRBC 0.0 0.0 - 0.2 /100 WBC   ECG 12 Lead    Collection Time: 01/08/22  6:23 PM   Result Value Ref Range    QT Interval 365 ms       Ordered the above labs and independently reviewed the results.        RADIOLOGY  No Radiology Exams Resulted Within Past 24 Hours    I ordered the above noted radiological studies. Reviewed by me and discussed with radiologist.  See dictation for official radiology interpretation.      PROCEDURES    Procedures      MEDICATIONS GIVEN IN ER    Medications - No data to display      PROGRESS, DATA ANALYSIS, CONSULTS, AND MEDICAL DECISION MAKING    All  labs have been independently reviewed by me.  All radiology studies have been reviewed by me and discussed with radiologist dictating the report.   EKG's independently viewed and interpreted by me.  Discussion below represents my analysis of pertinent findings related to patient's condition, differential diagnosis, treatment plan and final disposition.        ED Course as of 01/08/22 2324   Sat Jan 08, 2022   2323 CBC and chemistry are unremarkable [DP]   2324 EKG on arrival  Sinus rhythm at 88  Normal CA, QRS and QT  No acute ST segment changes are seen to suggest ischemia, and I disagree with the computer interpretation.  There is no significant change when compared to the EKG from March 14, 2016 [DP]   2324 I think she had a clear vasovagal episode just a few minutes after donating blood.  She does not appear anemic or hypovolemic.  She is not orthostatic, and I think she safe for discharge home at this point. [DP]      ED Course User Index  [DP] Josep Bowen MD           PPE: The patient wore a surgical mask throughout the entire patient encounter. I wore an N95.    AS OF 23:24 EST VITALS:    BP - 102/62  HR - 78  TEMP - 97.6 °F (36.4 °C) (Oral)  O2 SATS - 98%        DIAGNOSIS  Final diagnoses:   Near syncope         DISPOSITION  Discharge           Josep Bowen MD  01/08/22 2324

## 2022-01-09 LAB — QT INTERVAL: 365 MS

## 2022-01-12 ENCOUNTER — TRANSCRIBE ORDERS (OUTPATIENT)
Dept: ADMINISTRATIVE | Facility: HOSPITAL | Age: 67
End: 2022-01-12

## 2022-01-12 DIAGNOSIS — R55 SYNCOPE, UNSPECIFIED SYNCOPE TYPE: Primary | ICD-10-CM

## 2022-01-14 ENCOUNTER — HOSPITAL ENCOUNTER (OUTPATIENT)
Dept: CARDIOLOGY | Facility: HOSPITAL | Age: 67
Discharge: HOME | End: 2022-01-14
Admitting: INTERNAL MEDICINE

## 2022-01-14 DIAGNOSIS — R55 SYNCOPE, UNSPECIFIED SYNCOPE TYPE: ICD-10-CM

## 2022-01-14 LAB
BH CV XLRA MEAS LEFT DIST CCA EDV: 40.5 CM/SEC
BH CV XLRA MEAS LEFT DIST CCA PSV: 99.1 CM/SEC
BH CV XLRA MEAS LEFT DIST ICA EDV: 30.1 CM/SEC
BH CV XLRA MEAS LEFT DIST ICA PSV: 79.2 CM/SEC
BH CV XLRA MEAS LEFT ICA/CCA RATIO: 0.92
BH CV XLRA MEAS LEFT MID ICA EDV: 39.9 CM/SEC
BH CV XLRA MEAS LEFT MID ICA PSV: 89.8 CM/SEC
BH CV XLRA MEAS LEFT PROX CCA EDV: 38.7 CM/SEC
BH CV XLRA MEAS LEFT PROX CCA PSV: 116 CM/SEC
BH CV XLRA MEAS LEFT PROX ECA EDV: 19.6 CM/SEC
BH CV XLRA MEAS LEFT PROX ECA PSV: 60.9 CM/SEC
BH CV XLRA MEAS LEFT PROX ICA EDV: 34 CM/SEC
BH CV XLRA MEAS LEFT PROX ICA PSV: 90.9 CM/SEC
BH CV XLRA MEAS LEFT PROX SCLA PSV: 94.4 CM/SEC
BH CV XLRA MEAS LEFT VERTEBRAL A EDV: 17.7 CM/SEC
BH CV XLRA MEAS LEFT VERTEBRAL A PSV: 51.5 CM/SEC
BH CV XLRA MEAS RIGHT DIST CCA EDV: 25.8 CM/SEC
BH CV XLRA MEAS RIGHT DIST CCA PSV: 107 CM/SEC
BH CV XLRA MEAS RIGHT DIST ICA EDV: 34.6 CM/SEC
BH CV XLRA MEAS RIGHT DIST ICA PSV: 85 CM/SEC
BH CV XLRA MEAS RIGHT ICA/CCA RATIO: 0.89
BH CV XLRA MEAS RIGHT MID ICA EDV: 38.2 CM/SEC
BH CV XLRA MEAS RIGHT MID ICA PSV: 95.6 CM/SEC
BH CV XLRA MEAS RIGHT PROX CCA EDV: 35.8 CM/SEC
BH CV XLRA MEAS RIGHT PROX CCA PSV: 97.3 CM/SEC
BH CV XLRA MEAS RIGHT PROX ECA EDV: 18.2 CM/SEC
BH CV XLRA MEAS RIGHT PROX ECA PSV: 86.2 CM/SEC
BH CV XLRA MEAS RIGHT PROX ICA EDV: 27 CM/SEC
BH CV XLRA MEAS RIGHT PROX ICA PSV: 80.9 CM/SEC
BH CV XLRA MEAS RIGHT PROX SCLA PSV: 108 CM/SEC
BH CV XLRA MEAS RIGHT VERTEBRAL A EDV: 25.9 CM/SEC
BH CV XLRA MEAS RIGHT VERTEBRAL A PSV: 60.1 CM/SEC
LEFT ARM BP: NORMAL MMHG
RIGHT ARM BP: NORMAL MMHG

## 2022-01-14 PROCEDURE — 93880 EXTRACRANIAL BILAT STUDY: CPT

## 2022-02-16 ENCOUNTER — HOSPITAL ENCOUNTER (OUTPATIENT)
Dept: CARDIOLOGY | Facility: HOSPITAL | Age: 67
Discharge: HOME | End: 2022-02-16
Admitting: INTERNAL MEDICINE

## 2022-02-16 VITALS
DIASTOLIC BLOOD PRESSURE: 78 MMHG | HEIGHT: 63 IN | BODY MASS INDEX: 23.57 KG/M2 | WEIGHT: 133 LBS | SYSTOLIC BLOOD PRESSURE: 148 MMHG

## 2022-02-16 DIAGNOSIS — R55 SYNCOPE, UNSPECIFIED SYNCOPE TYPE: ICD-10-CM

## 2022-02-16 LAB
AORTIC DIMENSIONLESS INDEX: 1 (DI)
ASCENDING AORTA: 2.9 CM
BH CV ECHO MEAS - ACS: 1.9 CM
BH CV ECHO MEAS - AO MAX PG (FULL): 0.55 MMHG
BH CV ECHO MEAS - AO MAX PG: 6.1 MMHG
BH CV ECHO MEAS - AO MEAN PG (FULL): 0.52 MMHG
BH CV ECHO MEAS - AO MEAN PG: 3 MMHG
BH CV ECHO MEAS - AO ROOT AREA (BSA CORRECTED): 1.5
BH CV ECHO MEAS - AO ROOT AREA: 4.4 CM^2
BH CV ECHO MEAS - AO ROOT DIAM: 2.4 CM
BH CV ECHO MEAS - AO V2 MAX: 123.1 CM/SEC
BH CV ECHO MEAS - AO V2 MEAN: 79.9 CM/SEC
BH CV ECHO MEAS - AO V2 VTI: 23.6 CM
BH CV ECHO MEAS - ASC AORTA: 2.9 CM
BH CV ECHO MEAS - AVA(I,A): 2.7 CM^2
BH CV ECHO MEAS - AVA(I,D): 2.7 CM^2
BH CV ECHO MEAS - AVA(V,A): 2.5 CM^2
BH CV ECHO MEAS - AVA(V,D): 2.5 CM^2
BH CV ECHO MEAS - BSA(HAYCOCK): 1.6 M^2
BH CV ECHO MEAS - BSA: 1.6 M^2
BH CV ECHO MEAS - BZI_BMI: 23.6 KILOGRAMS/M^2
BH CV ECHO MEAS - BZI_METRIC_HEIGHT: 160 CM
BH CV ECHO MEAS - BZI_METRIC_WEIGHT: 60.3 KG
BH CV ECHO MEAS - EDV(CUBED): 30.9 ML
BH CV ECHO MEAS - EDV(MOD-SP2): 33 ML
BH CV ECHO MEAS - EDV(MOD-SP4): 42 ML
BH CV ECHO MEAS - EDV(TEICH): 39.1 ML
BH CV ECHO MEAS - EF(CUBED): 88.6 %
BH CV ECHO MEAS - EF(MOD-BP): 74.6 %
BH CV ECHO MEAS - EF(MOD-SP2): 63.6 %
BH CV ECHO MEAS - EF(MOD-SP4): 81 %
BH CV ECHO MEAS - EF(TEICH): 83.9 %
BH CV ECHO MEAS - ESV(CUBED): 3.5 ML
BH CV ECHO MEAS - ESV(MOD-SP2): 12 ML
BH CV ECHO MEAS - ESV(MOD-SP4): 8 ML
BH CV ECHO MEAS - ESV(TEICH): 6.3 ML
BH CV ECHO MEAS - FS: 51.5 %
BH CV ECHO MEAS - IVS/LVPW: 1.1
BH CV ECHO MEAS - IVSD: 0.82 CM
BH CV ECHO MEAS - LAT PEAK E' VEL: 8.5 CM/SEC
BH CV ECHO MEAS - LV DIASTOLIC VOL/BSA (35-75): 25.8 ML/M^2
BH CV ECHO MEAS - LV MASS(C)D: 60.4 GRAMS
BH CV ECHO MEAS - LV MASS(C)DI: 37.1 GRAMS/M^2
BH CV ECHO MEAS - LV MAX PG: 5.5 MMHG
BH CV ECHO MEAS - LV MEAN PG: 2.5 MMHG
BH CV ECHO MEAS - LV SYSTOLIC VOL/BSA (12-30): 4.9 ML/M^2
BH CV ECHO MEAS - LV V1 MAX: 117.4 CM/SEC
BH CV ECHO MEAS - LV V1 MEAN: 70.8 CM/SEC
BH CV ECHO MEAS - LV V1 VTI: 23.7 CM
BH CV ECHO MEAS - LVIDD: 3.1 CM
BH CV ECHO MEAS - LVIDS: 1.5 CM
BH CV ECHO MEAS - LVLD AP2: 6.1 CM
BH CV ECHO MEAS - LVLD AP4: 6.8 CM
BH CV ECHO MEAS - LVLS AP2: 5.1 CM
BH CV ECHO MEAS - LVLS AP4: 5.2 CM
BH CV ECHO MEAS - LVOT AREA (M): 2.5 CM^2
BH CV ECHO MEAS - LVOT AREA: 2.7 CM^2
BH CV ECHO MEAS - LVOT DIAM: 1.8 CM
BH CV ECHO MEAS - LVPWD: 0.72 CM
BH CV ECHO MEAS - MED PEAK E' VEL: 7.5 CM/SEC
BH CV ECHO MEAS - MV A DUR: 0.14 SEC
BH CV ECHO MEAS - MV A MAX VEL: 84.4 CM/SEC
BH CV ECHO MEAS - MV DEC SLOPE: 537.7 CM/SEC^2
BH CV ECHO MEAS - MV DEC TIME: 158 SEC
BH CV ECHO MEAS - MV E MAX VEL: 97.7 CM/SEC
BH CV ECHO MEAS - MV E/A: 1.2
BH CV ECHO MEAS - MV MAX PG: 4.4 MMHG
BH CV ECHO MEAS - MV MEAN PG: 1.7 MMHG
BH CV ECHO MEAS - MV P1/2T MAX VEL: 110.6 CM/SEC
BH CV ECHO MEAS - MV P1/2T: 60.3 MSEC
BH CV ECHO MEAS - MV V2 MAX: 105.2 CM/SEC
BH CV ECHO MEAS - MV V2 MEAN: 59.8 CM/SEC
BH CV ECHO MEAS - MV V2 VTI: 23.5 CM
BH CV ECHO MEAS - MVA P1/2T LCG: 2 CM^2
BH CV ECHO MEAS - MVA(P1/2T): 3.7 CM^2
BH CV ECHO MEAS - MVA(VTI): 2.7 CM^2
BH CV ECHO MEAS - PA ACC TIME: 0.13 SEC
BH CV ECHO MEAS - PA MAX PG (FULL): 0.9 MMHG
BH CV ECHO MEAS - PA MAX PG: 2 MMHG
BH CV ECHO MEAS - PA PR(ACCEL): 22 MMHG
BH CV ECHO MEAS - PA V2 MAX: 71.2 CM/SEC
BH CV ECHO MEAS - PULM A REVS DUR: 0.15 SEC
BH CV ECHO MEAS - PULM A REVS VEL: 29.6 CM/SEC
BH CV ECHO MEAS - PULM DIAS VEL: 45.8 CM/SEC
BH CV ECHO MEAS - PULM S/D: 1.2
BH CV ECHO MEAS - PULM SYS VEL: 55.7 CM/SEC
BH CV ECHO MEAS - RAP SYSTOLE: 3 MMHG
BH CV ECHO MEAS - RV MAX PG: 1.1 MMHG
BH CV ECHO MEAS - RV MEAN PG: 0.58 MMHG
BH CV ECHO MEAS - RV V1 MAX: 53.1 CM/SEC
BH CV ECHO MEAS - RV V1 MEAN: 34.4 CM/SEC
BH CV ECHO MEAS - RV V1 VTI: 10.1 CM
BH CV ECHO MEAS - RVSP: 18 MMHG
BH CV ECHO MEAS - SI(AO): 63.7 ML/M^2
BH CV ECHO MEAS - SI(CUBED): 16.8 ML/M^2
BH CV ECHO MEAS - SI(LVOT): 38.8 ML/M^2
BH CV ECHO MEAS - SI(MOD-SP2): 12.9 ML/M^2
BH CV ECHO MEAS - SI(MOD-SP4): 20.9 ML/M^2
BH CV ECHO MEAS - SI(TEICH): 20.2 ML/M^2
BH CV ECHO MEAS - SV(AO): 103.6 ML
BH CV ECHO MEAS - SV(CUBED): 27.4 ML
BH CV ECHO MEAS - SV(LVOT): 63.2 ML
BH CV ECHO MEAS - SV(MOD-SP2): 21 ML
BH CV ECHO MEAS - SV(MOD-SP4): 34 ML
BH CV ECHO MEAS - SV(TEICH): 32.8 ML
BH CV ECHO MEAS - TAPSE (>1.6): 1.8 CM
BH CV ECHO MEAS - TR MAX VEL: 195.5 CM/SEC
BH CV ECHO MEASUREMENTS AVERAGE E/E' RATIO: 12.21
BH CV VAS BP RIGHT ARM: NORMAL MMHG
BH CV XLRA - RV BASE: 2.9 CM
BH CV XLRA - RV LENGTH: 5.6 CM
BH CV XLRA - RV MID: 2.3 CM
BH CV XLRA - TDI S': 10.6 CM/SEC
LEFT ATRIUM VOLUME INDEX: 18 ML/M2
MAXIMAL PREDICTED HEART RATE: 154 BPM
SINUS: 2.3 CM
STJ: 2.4 CM
STRESS TARGET HR: 131 BPM

## 2022-02-16 PROCEDURE — 93306 TTE W/DOPPLER COMPLETE: CPT

## 2022-02-16 PROCEDURE — 93306 TTE W/DOPPLER COMPLETE: CPT | Performed by: INTERNAL MEDICINE

## 2023-09-25 ENCOUNTER — HOSPITAL ENCOUNTER (OUTPATIENT)
Dept: GENERAL RADIOLOGY | Facility: HOSPITAL | Age: 68
Discharge: HOME | End: 2023-09-25
Admitting: INTERNAL MEDICINE
Payer: MEDICARE

## 2023-09-25 DIAGNOSIS — M19.049 PRIMARY OSTEOARTHRITIS OF HAND, UNSPECIFIED LATERALITY: ICD-10-CM

## 2023-09-25 PROCEDURE — 73130 X-RAY EXAM OF HAND: CPT

## (undated) DEVICE — CATH ASP DUODENAL 2.5MM 180CM

## (undated) DEVICE — Device: Brand: DEFENDO AIR/WATER/SUCTION AND BIOPSY VALVE

## (undated) DEVICE — CANN NASL CO2 TRULINK W/O2 A/

## (undated) DEVICE — BITEBLOCK OMNI BLOC

## (undated) DEVICE — TUBING, SUCTION, 1/4" X 10', STRAIGHT: Brand: MEDLINE

## (undated) DEVICE — FRCP BX RADJAW4 NDL 2.8 240CM LG OG BX40